# Patient Record
Sex: FEMALE | Race: WHITE | NOT HISPANIC OR LATINO
[De-identification: names, ages, dates, MRNs, and addresses within clinical notes are randomized per-mention and may not be internally consistent; named-entity substitution may affect disease eponyms.]

---

## 2019-10-01 PROBLEM — Z00.00 ENCOUNTER FOR PREVENTIVE HEALTH EXAMINATION: Status: ACTIVE | Noted: 2019-10-01

## 2019-10-24 ENCOUNTER — APPOINTMENT (OUTPATIENT)
Dept: CARDIOLOGY | Facility: CLINIC | Age: 39
End: 2019-10-24

## 2021-09-27 ENCOUNTER — APPOINTMENT (OUTPATIENT)
Dept: OBGYN | Facility: CLINIC | Age: 41
End: 2021-09-27
Payer: MEDICAID

## 2021-09-27 VITALS
BODY MASS INDEX: 20.57 KG/M2 | DIASTOLIC BLOOD PRESSURE: 60 MMHG | SYSTOLIC BLOOD PRESSURE: 120 MMHG | WEIGHT: 128 LBS | HEIGHT: 66 IN

## 2021-09-27 DIAGNOSIS — N92.0 EXCESSIVE AND FREQUENT MENSTRUATION WITH REGULAR CYCLE: ICD-10-CM

## 2021-09-27 DIAGNOSIS — B00.9 HERPESVIRAL INFECTION, UNSPECIFIED: ICD-10-CM

## 2021-09-27 LAB
HCG UR QL: NEGATIVE
QUALITY CONTROL: YES

## 2021-09-27 PROCEDURE — 90471 IMMUNIZATION ADMIN: CPT

## 2021-09-27 PROCEDURE — 90651 9VHPV VACCINE 2/3 DOSE IM: CPT

## 2021-09-27 PROCEDURE — 99203 OFFICE O/P NEW LOW 30 MIN: CPT | Mod: 25

## 2021-09-27 PROCEDURE — 81025 URINE PREGNANCY TEST: CPT

## 2021-09-27 RX ORDER — VALACYCLOVIR 500 MG/1
500 TABLET, FILM COATED ORAL DAILY
Qty: 90 | Refills: 1 | Status: ACTIVE | COMMUNITY
Start: 2021-09-27 | End: 1900-01-01

## 2021-09-27 NOTE — HISTORY OF PRESENT ILLNESS
[FreeTextEntry1] : This is a 41-year-old white female para 3 who presents as a new patient. She is reporting a history of HSV with which she gets very painful outbreaks and wishes to go on prophylaxis. She has been on prophylaxis but has run out. She is currently intermittently sexually active and uses condoms. She reports somewhat heavy menses but does not really consider an issue. She also wishes to get her followup gardasil vaccine. She has had the first one several months ago.\par \par She has a medical history of depression anxiety and takes Prozac Lamictal and Wellbutrin. Surgical history of hernia repair, abdominal plasty and 3 C-sections. OB history significant for 3 C-sections. She is . She denies tobacco or drug use but drinks occasionally.\par \par She reports a family history of breast cancer in her mother in her 50s . She gets annual mammograms. She recently had a Pap smear as well.\par \par Patient owns a cleaning business.

## 2021-09-27 NOTE — DISCUSSION/SUMMARY
[FreeTextEntry1] : Regarding her HSV infection I discussed going back on prophylaxis and I am prescribing her for this. Pros and cons were discussed.\par \par We also discussed her somewhat heavy menses and the uterus feels minimally enlarged with probable fibroids. We discussed considering a sonogram and I discussed treatment options including oral contraceptives, IUD or ablation the patient currently declines. All questions were answered.\par \par Gardasil # 2 was given without complications.

## 2022-07-14 ENCOUNTER — APPOINTMENT (OUTPATIENT)
Dept: OBGYN | Facility: CLINIC | Age: 42
End: 2022-07-14

## 2022-07-14 VITALS
WEIGHT: 134 LBS | BODY MASS INDEX: 21.53 KG/M2 | SYSTOLIC BLOOD PRESSURE: 126 MMHG | HEIGHT: 66 IN | DIASTOLIC BLOOD PRESSURE: 80 MMHG

## 2022-07-14 PROCEDURE — 99213 OFFICE O/P EST LOW 20 MIN: CPT

## 2022-07-14 PROCEDURE — 76830 TRANSVAGINAL US NON-OB: CPT

## 2022-07-14 NOTE — DISCUSSION/SUMMARY
[FreeTextEntry1] : Patient had a weakly positive UCG here.  I discussed the implications of this that it may suggest possibility of miscarriage.  I also discussed small risk of ectopic pregnancy and signs and symptoms were discussed with patient.  Ultrasound revealed no gestational sac as would be expected and adnexa were normal with no free fluid.\par \par Patient was given prescription for serial beta hCGs as well as type and screen.  She will follow-up again in 1 week.  All her questions were answered.

## 2022-07-14 NOTE — PROCEDURE
[Transvaginal OB Sonogram] : Transvaginal OB Sonogram [FreeTextEntry1] : no gs; no ys; nl adnexa; no free fluid

## 2022-07-14 NOTE — HISTORY OF PRESENT ILLNESS
[FreeTextEntry1] : 42-year-old white female  4 para 3 presents for follow-up visit with positive UCG at home.  Her LMP was 2022 giving her an EGA of 4 weeks and 3 days.  She had a scant amount of blood that has now resolved.  She denies any breast tenderness or nausea.  Patient is not interested in keeping this pregnancy and is scheduled to go to [facility].

## 2022-07-15 ENCOUNTER — LABORATORY RESULT (OUTPATIENT)
Age: 42
End: 2022-07-15

## 2022-07-18 ENCOUNTER — LABORATORY RESULT (OUTPATIENT)
Age: 42
End: 2022-07-18

## 2022-07-20 ENCOUNTER — ASOB RESULT (OUTPATIENT)
Age: 42
End: 2022-07-20

## 2022-07-20 ENCOUNTER — APPOINTMENT (OUTPATIENT)
Dept: ANTEPARTUM | Facility: CLINIC | Age: 42
End: 2022-07-20

## 2022-07-20 ENCOUNTER — LABORATORY RESULT (OUTPATIENT)
Age: 42
End: 2022-07-20

## 2022-07-20 ENCOUNTER — APPOINTMENT (OUTPATIENT)
Dept: OBGYN | Facility: CLINIC | Age: 42
End: 2022-07-20

## 2022-07-20 VITALS — BODY MASS INDEX: 21.53 KG/M2 | WEIGHT: 134 LBS | HEIGHT: 66 IN

## 2022-07-20 PROCEDURE — 76817 TRANSVAGINAL US OBSTETRIC: CPT

## 2022-07-20 PROCEDURE — 99213 OFFICE O/P EST LOW 20 MIN: CPT

## 2022-07-20 NOTE — HISTORY OF PRESENT ILLNESS
[FreeTextEntry1] : 42-year-old white female para 3 here for follow-up visit.  Her LMP is 6/13/2022 giving her an EGA of 5 weeks.  Patient was seen on 7/14/2022 and was not interested in keeping the pregnancy.  Serial hCG quantitative's revealed an increase from 92 to 289 over 3 days.  Currently denies any pain or bleeding.\par \par Ultrasound today with the technician reveals gestational sac with no yolk sac and normal adnexa.  There is no free fluid.

## 2022-07-20 NOTE — DISCUSSION/SUMMARY
[FreeTextEntry1] : I discussed the findings of the sonogram as well as her beta-hCG results with her.  Patient appears to have appropriately rising beta hCG results.  I discussed small risk of ectopic pregnancy and the signs and symptoms of ectopic including bleeding, pain etc.  Patient will go again for serial beta hCGs until she reaches the discriminatory zone.  She has now decided to keep the pregnancy.

## 2022-07-21 ENCOUNTER — APPOINTMENT (OUTPATIENT)
Dept: OBGYN | Facility: CLINIC | Age: 42
End: 2022-07-21

## 2022-07-22 ENCOUNTER — LABORATORY RESULT (OUTPATIENT)
Age: 42
End: 2022-07-22

## 2022-07-25 ENCOUNTER — LABORATORY RESULT (OUTPATIENT)
Age: 42
End: 2022-07-25

## 2022-07-28 ENCOUNTER — NON-APPOINTMENT (OUTPATIENT)
Age: 42
End: 2022-07-28

## 2022-08-02 ENCOUNTER — APPOINTMENT (OUTPATIENT)
Dept: ANTEPARTUM | Facility: CLINIC | Age: 42
End: 2022-08-02

## 2022-08-02 ENCOUNTER — NON-APPOINTMENT (OUTPATIENT)
Age: 42
End: 2022-08-02

## 2022-08-02 ENCOUNTER — APPOINTMENT (OUTPATIENT)
Dept: OBGYN | Facility: CLINIC | Age: 42
End: 2022-08-02

## 2022-08-02 ENCOUNTER — ASOB RESULT (OUTPATIENT)
Age: 42
End: 2022-08-02

## 2022-08-02 VITALS
SYSTOLIC BLOOD PRESSURE: 137 MMHG | HEART RATE: 88 BPM | WEIGHT: 134 LBS | HEIGHT: 66 IN | DIASTOLIC BLOOD PRESSURE: 78 MMHG | BODY MASS INDEX: 21.53 KG/M2

## 2022-08-02 DIAGNOSIS — Z30.09 ENCOUNTER FOR OTHER GENERAL COUNSELING AND ADVICE ON CONTRACEPTION: ICD-10-CM

## 2022-08-02 DIAGNOSIS — Z32.01 ENCOUNTER FOR PREGNANCY TEST, RESULT POSITIVE: ICD-10-CM

## 2022-08-02 DIAGNOSIS — N94.6 DYSMENORRHEA, UNSPECIFIED: ICD-10-CM

## 2022-08-02 DIAGNOSIS — O03.9 COMPLETE OR UNSPECIFIED SPONTANEOUS ABORTION W/OUT COMPLICATION: ICD-10-CM

## 2022-08-02 PROCEDURE — 76817 TRANSVAGINAL US OBSTETRIC: CPT

## 2022-08-02 PROCEDURE — 99214 OFFICE O/P EST MOD 30 MIN: CPT

## 2022-08-02 RX ORDER — MISOPROSTOL 200 UG/1
200 TABLET ORAL
Qty: 1 | Refills: 0 | Status: ACTIVE | COMMUNITY
Start: 2022-08-02 | End: 1900-01-01

## 2022-08-02 NOTE — HISTORY OF PRESENT ILLNESS
[FreeTextEntry1] : 42-year-old female -0-0-3 presenting office after having an ultrasound performed at [facility] today.  Her ultrasound was consistent with a complete .  Patient had contacted office on 2022 with a complaint of vaginal bleeding and cramping.  Patient reports his bleeding and cramping increased and she passed tissue like substance.  She had been diagnosed with an early gestation on 2022 via ultrasound in which a gestational sac was visualized.  Her subsequent quantitative beta hCG had risen appropriately.  Her blood type is A+.\par \par She would like to discuss options for contraception today.  She may be interested in permanent sterilization.

## 2022-08-02 NOTE — PLAN
[FreeTextEntry1] : \par Subjective history, past imaging and comparison with imaging resulted today consistent with a complete .  She does not require sequential hCG screening.  Blood type is A+.  Expectations reviewed at length along with call, follow-up, ER precautions.\par \par Risks, benefits, and alternatives of options for contraception including SARCs versus LARCs versus permanent sterilization discussed at length and patient wishes for a long-acting reproductive contraceptive, specifically the Mirena IUD.  Cytotec 200 mcg was sent to pharmacy with direction to be placed vaginally the night prior to the procedure.  Patient will contact the office once she is approved and present for placement.  She was given opportunity ask questions all questions were addressed.  She understands plan of care.

## 2022-08-02 NOTE — REASON FOR VISIT
[Follow-Up] : a follow-up evaluation of [FreeTextEntry2] : Follow-up status post ultrasound and consultation regarding contraception

## 2022-10-03 ENCOUNTER — APPOINTMENT (OUTPATIENT)
Dept: OBGYN | Facility: CLINIC | Age: 42
End: 2022-10-03